# Patient Record
Sex: MALE | Race: WHITE | Employment: UNEMPLOYED | URBAN - METROPOLITAN AREA
[De-identification: names, ages, dates, MRNs, and addresses within clinical notes are randomized per-mention and may not be internally consistent; named-entity substitution may affect disease eponyms.]

---

## 2018-01-23 ENCOUNTER — APPOINTMENT (OUTPATIENT)
Dept: OCCUPATIONAL MEDICINE | Age: 53
End: 2018-01-23
Attending: EMERGENCY MEDICINE
Payer: COMMERCIAL

## 2018-01-26 ENCOUNTER — APPOINTMENT (OUTPATIENT)
Dept: GENERAL RADIOLOGY | Age: 53
End: 2018-01-26
Attending: EMERGENCY MEDICINE
Payer: COMMERCIAL

## 2018-01-26 ENCOUNTER — HOSPITAL ENCOUNTER (OUTPATIENT)
Age: 53
Discharge: HOME | End: 2018-01-26
Payer: COMMERCIAL

## 2018-01-26 VITALS
OXYGEN SATURATION: 99 % | TEMPERATURE: 99 F | HEART RATE: 85 BPM | RESPIRATION RATE: 20 BRPM | BODY MASS INDEX: 22.76 KG/M2 | HEIGHT: 67 IN | WEIGHT: 145 LBS | DIASTOLIC BLOOD PRESSURE: 107 MMHG | SYSTOLIC BLOOD PRESSURE: 158 MMHG

## 2018-01-26 DIAGNOSIS — R03.0 ELEVATED BLOOD PRESSURE READING: ICD-10-CM

## 2018-01-26 DIAGNOSIS — M20.011 MALLET DEFORMITY OF RIGHT RING FINGER: Primary | ICD-10-CM

## 2018-01-26 PROCEDURE — 99203 OFFICE O/P NEW LOW 30 MIN: CPT

## 2018-01-26 PROCEDURE — 73140 X-RAY EXAM OF FINGER(S): CPT | Performed by: EMERGENCY MEDICINE

## 2018-01-26 RX ORDER — AMLODIPINE BESYLATE 10 MG/1
10 TABLET ORAL DAILY
COMMUNITY

## 2018-01-26 RX ORDER — PRAVASTATIN SODIUM 20 MG
20 TABLET ORAL NIGHTLY
COMMUNITY

## 2018-01-26 RX ORDER — FENOFIBRATE 145 MG/1
145 TABLET, COATED ORAL DAILY
COMMUNITY

## 2018-01-26 NOTE — ED INITIAL ASSESSMENT (HPI)
Per pt since last night slight pain and right 4th digit will not flex. Pt reports finger was caught in belt loop.

## 2018-01-26 NOTE — ED PROVIDER NOTES
Patient Seen in: [address]    History   Patient presents with:  Upper Extremity Injury (musculoskeletal)    Stated Complaint: FINGER INJURY     HPI  Patient states last night he was getting undressed and somehow jammed Right hand: He exhibits decreased range of motion (Unable to extend the fourth DIP) and deformity. Normal sensation noted. Normal strength noted. Hands:  Neurological: He is alert and oriented to person, place, and time.  Coordination normal. Medications Prescribed:  Discharge Medication List as of 1/26/2018 10:13 AM